# Patient Record
Sex: FEMALE | Race: WHITE | Employment: OTHER | URBAN - METROPOLITAN AREA
[De-identification: names, ages, dates, MRNs, and addresses within clinical notes are randomized per-mention and may not be internally consistent; named-entity substitution may affect disease eponyms.]

---

## 2017-01-26 ENCOUNTER — ALLIED HEALTH/NURSE VISIT (OUTPATIENT)
Dept: CARDIOLOGY | Facility: CLINIC | Age: 82
End: 2017-01-26
Payer: MEDICARE

## 2017-01-26 DIAGNOSIS — Z95.0 CARDIAC PACEMAKER IN SITU: Primary | ICD-10-CM

## 2017-01-26 PROCEDURE — 93293 PM PHONE R-STRIP DEVICE EVAL: CPT | Mod: GW | Performed by: INTERNAL MEDICINE

## 2017-01-26 NOTE — PROGRESS NOTES
Phone teletrace~  AS/ with occ PACs at time of check. Magnet response WNL. F/U phone teletrace q 3 months.

## 2017-04-27 ENCOUNTER — ALLIED HEALTH/NURSE VISIT (OUTPATIENT)
Dept: CARDIOLOGY | Facility: CLINIC | Age: 82
End: 2017-04-27
Payer: MEDICARE

## 2017-04-27 DIAGNOSIS — Z95.0 CARDIAC PACEMAKER IN SITU: Primary | ICD-10-CM

## 2017-04-27 PROCEDURE — 93293 PM PHONE R-STRIP DEVICE EVAL: CPT | Mod: GW | Performed by: INTERNAL MEDICINE

## 2017-04-27 NOTE — NURSING NOTE
Phone teletrace~  AS/ at time of check. Magnet response WNL. F/U phone teletrace q 3 months. For annual threshold.  Order placed in order to schd same time as MD.

## 2017-04-27 NOTE — MR AVS SNAPSHOT
"              After Visit Summary   4/27/2017    Teena Andrews    MRN: [id number]           Patient Information     Date Of Birth          8/6/1928        Visit Information        Provider Department      4/27/2017 11:30 AM [facility] TECH2 [facility]        Today's Diagnoses     Cardiac pacemaker in situ    -  1       Follow-ups after your visit        Your next 10 appointments already scheduled     Jun 28, 2017  2:30 PM CDT   Return Visit with Teena Mas PA-C   [facility] ([facility])    [address] [place] [address] [id number] [phone]              Who to contact     If you have questions or need follow up information about today's clinic visit or your schedule please contact [facility] directly at [phone].  Normal or non-critical lab and imaging results will be communicated to you by [employer], letter or phone within 4 business days after the clinic has received the results. If you do not hear from us within 7 days, please contact the clinic through MyChart or phone. If you have a critical or abnormal lab result, we will notify you by phone as soon as possible.  Submit refill requests through [employer] or call your pharmacy and they will forward the refill request to us. Please allow 3 business days for your refill to be completed.          Additional Information About Your Visit        MyChart Information     [employer] lets you send messages to your doctor, view your test results, renew your prescriptions, schedule appointments and more. To sign up, go to [email or web address] . Click on \"Log in\" on the left side of the screen, which will take you to the Welcome page. Then click on \"Sign up Now\" on the right side of the page.     You will be asked to enter the access code listed below, as well as some personal information. Please follow the directions to " create your username and password.     Your access code is: [email or web address]  Expires: 2017 11:41 AM     Your access code will  in 90 days. If you need help or a new code, please call your [place] clinic or [phone].        Care EveryWhere ID     This is your Care EveryWhere ID. This could be used by other organizations to access your [place] medical records  [id number]         Blood Pressure from Last 3 Encounters:   16 122/52   05/20/15 128/56   14 118/62    Weight from Last 3 Encounters:   05/20/15 50.3 kg (111 lb)   14 53.5 kg (118 lb)   13 58.1 kg (128 lb)              We Performed the Following     PM PHONE R STRIP EVAL UP TO 90 DAYS ([phone])        Primary Care Provider Office Phone # Fax #    Amish Celaya [phone]       [facility] [address] [place] [address]        Thank you!     Thank you for choosing [facility]  for your care. Our goal is always to provide you with excellent care. Hearing back from our patients is one way we can continue to improve our services. Please take a few minutes to complete the written survey that you may receive in the mail after your visit with us. Thank you!             Your Updated Medication List - Protect others around you: Learn how to safely use, store and throw away your medicines at www.disposemymeds.org.          This list is accurate as of: 17 11:41 AM.  Always use your most recent med list.                   Brand Name Dispense Instructions for use    acetaminophen 325 MG tablet    TYLENOL     Take 1-2 tablets by mouth every 6 hours as needed. Indications: Pain       ARTIFICIAL TEAR SOLUTION OP      Apply to eye as needed       aspirin 325 MG tablet      Take by mouth daily       carbidopa-levodopa  MG per ODT tab    PARCOPA     Take 1 tablet by mouth 4 times daily.       clotrimazole-betamethasone cream    LOTRISONE    30 g     Apply topically 2 times daily       losartan 50 MG tablet    COZAAR     Take 50 mg by mouth daily       PAXIL PO      Take 10 mg by mouth daily       senna-docusate 8.6-50 MG per tablet    SENOKOT-S;PERICOLACE    60 tablet    Take 1-2 tablets by mouth 2 times daily as needed (constipation ).       * SEROQUEL 25 MG tablet   Generic drug:  QUEtiapine      Take by mouth every evening       * SEROQUEL PO      Take 12.5 mg by mouth 3 times daily       * SEROQUEL PO      Take 6.25 mg by mouth as needed       SYSTANE PRESERVATIVE FREE 0.4-0.3 % Soln ophthalmic solution   Generic drug:  polyethylene glycol 0.4%- propylene glycol 0.3%      Place 1 drop into both eyes 3 times daily as needed.       VITAMIN D3 PO      Take 1,000 Units by mouth daily       * Notice:  This list has 3 medication(s) that are the same as other medications prescribed for you. Read the directions carefully, and ask your doctor or other care provider to review them with you.

## 2017-07-28 DIAGNOSIS — I48.20 CHRONIC ATRIAL FIBRILLATION (H): Primary | ICD-10-CM

## 2017-07-28 RX ORDER — ASPIRIN 81 MG/1
81 TABLET, CHEWABLE ORAL DAILY
Qty: 90 TABLET | Refills: 0 | Status: SHIPPED | OUTPATIENT
Start: 2017-07-28 | End: 2017-08-07

## 2017-08-07 ENCOUNTER — TELEPHONE (OUTPATIENT)
Dept: CARDIOLOGY | Facility: CLINIC | Age: 82
End: 2017-08-07

## 2017-08-07 DIAGNOSIS — I48.20 CHRONIC ATRIAL FIBRILLATION (H): ICD-10-CM

## 2017-08-07 RX ORDER — ASPIRIN 81 MG/1
81 TABLET, CHEWABLE ORAL DAILY
Qty: 90 TABLET | Refills: 0 | Status: SHIPPED | OUTPATIENT
Start: 2017-08-07

## 2017-08-07 NOTE — TELEPHONE ENCOUNTER
Left message for daughter- noted pt is in hospice care and declined follow up with Dr. Cintron  Asked daughter to call me as to who should refill meds.  Also, concern with potential bleeding issues with pt on Indomethacin